# Patient Record
Sex: FEMALE | Race: WHITE | NOT HISPANIC OR LATINO | Employment: OTHER | URBAN - METROPOLITAN AREA
[De-identification: names, ages, dates, MRNs, and addresses within clinical notes are randomized per-mention and may not be internally consistent; named-entity substitution may affect disease eponyms.]

---

## 2018-07-06 ENCOUNTER — TELEPHONE (OUTPATIENT)
Dept: ENDOCRINOLOGY | Facility: HOSPITAL | Age: 73
End: 2018-07-06

## 2018-07-07 DIAGNOSIS — E04.2 MULTIPLE THYROID NODULES: Primary | ICD-10-CM

## 2018-08-02 ENCOUNTER — OFFICE VISIT (OUTPATIENT)
Dept: ENDOCRINOLOGY | Facility: HOSPITAL | Age: 73
End: 2018-08-02
Payer: MEDICARE

## 2018-08-02 VITALS
BODY MASS INDEX: 28.14 KG/M2 | DIASTOLIC BLOOD PRESSURE: 80 MMHG | HEIGHT: 63 IN | SYSTOLIC BLOOD PRESSURE: 136 MMHG | HEART RATE: 91 BPM | WEIGHT: 158.8 LBS

## 2018-08-02 DIAGNOSIS — E04.2 MULTIPLE THYROID NODULES: Primary | ICD-10-CM

## 2018-08-02 DIAGNOSIS — E78.5 HYPERLIPIDEMIA, UNSPECIFIED HYPERLIPIDEMIA TYPE: ICD-10-CM

## 2018-08-02 PROCEDURE — 99204 OFFICE O/P NEW MOD 45 MIN: CPT | Performed by: INTERNAL MEDICINE

## 2018-08-02 RX ORDER — PRAVASTATIN SODIUM 20 MG
TABLET ORAL DAILY
COMMUNITY
Start: 2018-07-29

## 2018-08-02 NOTE — LETTER
August 2, 2018     Melody Freitas DO  [address] [facility],[address]  [address] [place] [address]    Patient: Andrew Bran   YOB: 1945   Date of Visit: 8/2/2018       Dear Dr Agusto Martinez: Thank you for referring Marilin Andrade to me for evaluation  Below are my notes for this consultation  If you have questions, please do not hesitate to call me  I look forward to following your patient along with you  Sincerely,        Arnie Tapia DO        CC: No Recipients  Arnie Tapia DO  8/2/2018  1:46 PM  Sign at close encounter  8/2/2018    Assessment/Plan      Diagnoses and all orders for this visit:    Multiple thyroid nodules  -     US thyroid; Future  -     TSH, 3rd generation Lab Collect; Future  -     Comprehensive metabolic panel Lab Collect; Future    Hyperlipidemia, unspecified hyperlipidemia type  -     Lipid Panel with Direct LDL reflex Lab Collect; Future  -     Comprehensive metabolic panel Lab Collect; Future    Other orders  -     pravastatin (PRAVACHOL) 20 mg tablet; Take by mouth daily          Assessment/Plan:  1  Multiple thyroid nodules:  Overall, the nodules looks stable  I think the differences in size are more technical in difference in terms of year to year measurements  I would continue to monitor this on yearly basis with a TSH another thyroid ultrasound in 1 year  Will acquire records from prior thyroid nodule biopsies for completeness for our records  2   Hyperlipidemia:  Cholesterol looks good on pravastatin 20 mg daily  CC:   Thyroid nodules    History of Present Illness     HPI: Andrew Bran is a 72y o  year old female with history of thyroid nodules who presents to [facility] care  Previous patient of doctor Princess  She reports this was found after an illness and then she noticed some swelling in her throat  Thyroid nodules underwent 2 rounds of biopsies which were all benign reportedly  She then underwent surveillance over the years    She denies any neck compressive symptoms from the thyroid nodules  She does report some food getting stuck, such as rice and dry meat, but this sounds more esophageal in nature  She states this is not a frequent occurrence  She denies known first-degree relatives with thyroid cancer  She did not have any radiation to her head or neck in the past   Overall, she feels well  She also has a history of osteopenia and has DEXA scan monitoring through her family doctor  She was started on pravastatin for hyperlipidemia and is tolerating it fine  Review of Systems   Constitutional: Negative for fatigue  HENT: Negative for trouble swallowing and voice change  Eyes: Negative for visual disturbance  Respiratory: Negative for shortness of breath  Cardiovascular: Negative for palpitations and leg swelling  Gastrointestinal: Negative for abdominal pain, nausea and vomiting  Endocrine: Negative for polydipsia and polyuria  Musculoskeletal: Negative for arthralgias and myalgias  Skin: Negative for rash  Neurological: Negative for dizziness, tremors and weakness  Hematological: Negative for adenopathy  Psychiatric/Behavioral: Negative for agitation and confusion  Historical Information   History reviewed  No pertinent past medical history  History reviewed  No pertinent surgical history  Social History   History   Alcohol Use No     History   Drug Use No     History   Smoking Status    Never Smoker   Smokeless Tobacco    Never Used     Family History:   Family History   Problem Relation Age of Onset    Hypertension Sister        Meds/Allergies   Current Outpatient Prescriptions   Medication Sig Dispense Refill    pravastatin (PRAVACHOL) 20 mg tablet Take by mouth daily         No current facility-administered medications for this visit        Allergies   Allergen Reactions    Aspirin     Blue Dyes (Parenteral)     Latex     Metoprolol     Nifedipine        Objective   Vitals: Blood pressure 136/80, pulse 91, height 5' 2 64" (1 591 m), weight 72 kg (158 lb 12 8 oz)  Invasive Devices          No matching active lines, drains, or airways          Physical Exam   Constitutional: She is oriented to person, place, and time  She appears well-developed and well-nourished  No distress  HENT:   Head: Normocephalic and atraumatic  Eyes: Conjunctivae and EOM are normal  Pupils are equal, round, and reactive to light  Neck: Normal range of motion  Neck supple  Thyromegaly present  Cardiovascular: Normal rate and regular rhythm  No murmur heard  Pulmonary/Chest: Effort normal  No respiratory distress  Abdominal: Soft  Bowel sounds are normal  She exhibits no distension  Musculoskeletal: Normal range of motion  She exhibits no edema  Lymphadenopathy:     She has no cervical adenopathy  Neurological: She is alert and oriented to person, place, and time  She exhibits normal muscle tone  Skin: Skin is warm and dry  No rash noted  She is not diaphoretic  Psychiatric: She has a normal mood and affect  Her behavior is normal        The history was obtained from the review of the chart and from the patient  Lab Results:      Labs from [address] [place] [address] on 07/24/2018:  Sodium 142, potassium 4 4, BUN 15, creatinine 0 59, glucose 96, calcium 9 6, albumin 4 6, liver function within normal limits, total cholesterol 190, triglycerides 113, LDL 91, HDL 76, TSH 1 75  Thyroid ultrasound from [facility] on 07/24/2018: In the right lobe of the thyroid in the lower pole and interpolar segments these are occupied by a solid, heterogeneous, isoechoic solid and cystic approximately 4 4 x 2 8 x 3 8 cm that previously measured about 3 3 x 2 7 x 3 5 cm in 2017 and 4 2 x 3 1 x 3 0 cm on 2015 is stable  In the left thyroid lobe there are 3 discrete nodules measured    In the upper pole there is a hypoechoic approximately 1 2 x 0 7 x 0 9 cm nodule previously 1 5 x 0 6 x 0 8 cm in 2017 1 0 x 0 7 x 0 6 cm in 2015  In the interpolar segment is an approximately 1 2 x 0 7 x 1 0 cm hypoechoic nodule with bulky shadowing calcifications previously 1 0 x 0 5 x 1 0 cm in 2017 stable  This nodule underwent ultrasound-guided fine-needle aspiration biopsy on 07/18/2012 and measured about 1 1 x 0 5 x 0 7 cm in 2012  In the lower pole there is a predominantly solid approximately 2 2 x 1 8 x 1 6 cm nodule with a small echogenic non shadowing foci previously 2 3 x 1 6 x 1 6 cm in 2017 and 2 5 x 1 8 x 1 6 cm in 2015 stable in size in appearance  Impression:  Stable bilateral thyroid ultrasound as compared to 2017 considering differences in sonographic measurement technique  No future appointments

## 2018-08-02 NOTE — PROGRESS NOTES
8/2/2018    Assessment/Plan      Diagnoses and all orders for this visit:    Multiple thyroid nodules  -     US thyroid; Future  -     TSH, 3rd generation Lab Collect; Future  -     Comprehensive metabolic panel Lab Collect; Future    Hyperlipidemia, unspecified hyperlipidemia type  -     Lipid Panel with Direct LDL reflex Lab Collect; Future  -     Comprehensive metabolic panel Lab Collect; Future    Other orders  -     pravastatin (PRAVACHOL) 20 mg tablet; Take by mouth daily          Assessment/Plan:  1  Multiple thyroid nodules:  Overall, the nodules looks stable  I think the differences in size are more technical in difference in terms of year to year measurements  I would continue to monitor this on yearly basis with a TSH another thyroid ultrasound in 1 year  Will acquire records from prior thyroid nodule biopsies for completeness for our records  2   Hyperlipidemia:  Cholesterol looks good on pravastatin 20 mg daily  CC:   Thyroid nodules    History of Present Illness     HPI: Chante Griffith is a 72y o  year old female with history of thyroid nodules who presents to [facility] care  Previous patient of doctor Princess  She reports this was found after an illness and then she noticed some swelling in her throat  Thyroid nodules underwent 2 rounds of biopsies which were all benign reportedly  She then underwent surveillance over the years  She denies any neck compressive symptoms from the thyroid nodules  She does report some food getting stuck, such as rice and dry meat, but this sounds more esophageal in nature  She states this is not a frequent occurrence  She denies known first-degree relatives with thyroid cancer  She did not have any radiation to her head or neck in the past   Overall, she feels well  She also has a history of osteopenia and has DEXA scan monitoring through her family doctor  She was started on pravastatin for hyperlipidemia and is tolerating it fine      Review of Systems   Constitutional: Negative for fatigue  HENT: Negative for trouble swallowing and voice change  Eyes: Negative for visual disturbance  Respiratory: Negative for shortness of breath  Cardiovascular: Negative for palpitations and leg swelling  Gastrointestinal: Negative for abdominal pain, nausea and vomiting  Endocrine: Negative for polydipsia and polyuria  Musculoskeletal: Negative for arthralgias and myalgias  Skin: Negative for rash  Neurological: Negative for dizziness, tremors and weakness  Hematological: Negative for adenopathy  Psychiatric/Behavioral: Negative for agitation and confusion  Historical Information   History reviewed  No pertinent past medical history  History reviewed  No pertinent surgical history  Social History   History   Alcohol Use No     History   Drug Use No     History   Smoking Status    Never Smoker   Smokeless Tobacco    Never Used     Family History:   Family History   Problem Relation Age of Onset    Hypertension Sister        Meds/Allergies   Current Outpatient Prescriptions   Medication Sig Dispense Refill    pravastatin (PRAVACHOL) 20 mg tablet Take by mouth daily         No current facility-administered medications for this visit  Allergies   Allergen Reactions    Aspirin     Blue Dyes (Parenteral)     Latex     Metoprolol     Nifedipine        Objective   Vitals: Blood pressure 136/80, pulse 91, height 5' 2 64" (1 591 m), weight 72 kg (158 lb 12 8 oz)  Invasive Devices          No matching active lines, drains, or airways          Physical Exam   Constitutional: She is oriented to person, place, and time  She appears well-developed and well-nourished  No distress  HENT:   Head: Normocephalic and atraumatic  Eyes: Conjunctivae and EOM are normal  Pupils are equal, round, and reactive to light  Neck: Normal range of motion  Neck supple  Thyromegaly present  Cardiovascular: Normal rate and regular rhythm      No murmur heard  Pulmonary/Chest: Effort normal  No respiratory distress  Abdominal: Soft  Bowel sounds are normal  She exhibits no distension  Musculoskeletal: Normal range of motion  She exhibits no edema  Lymphadenopathy:     She has no cervical adenopathy  Neurological: She is alert and oriented to person, place, and time  She exhibits normal muscle tone  Skin: Skin is warm and dry  No rash noted  She is not diaphoretic  Psychiatric: She has a normal mood and affect  Her behavior is normal        The history was obtained from the review of the chart and from the patient  Lab Results:      Labs from [address] [place] [address] on 07/24/2018:  Sodium 142, potassium 4 4, BUN 15, creatinine 0 59, glucose 96, calcium 9 6, albumin 4 6, liver function within normal limits, total cholesterol 190, triglycerides 113, LDL 91, HDL 76, TSH 1 75  Thyroid ultrasound from [facility] on 07/24/2018: In the right lobe of the thyroid in the lower pole and interpolar segments these are occupied by a solid, heterogeneous, isoechoic solid and cystic approximately 4 4 x 2 8 x 3 8 cm that previously measured about 3 3 x 2 7 x 3 5 cm in 2017 and 4 2 x 3 1 x 3 0 cm on 2015 is stable  In the left thyroid lobe there are 3 discrete nodules measured  In the upper pole there is a hypoechoic approximately 1 2 x 0 7 x 0 9 cm nodule previously 1 5 x 0 6 x 0 8 cm in 2017 1 0 x 0 7 x 0 6 cm in 2015  In the interpolar segment is an approximately 1 2 x 0 7 x 1 0 cm hypoechoic nodule with bulky shadowing calcifications previously 1 0 x 0 5 x 1 0 cm in 2017 stable  This nodule underwent ultrasound-guided fine-needle aspiration biopsy on 07/18/2012 and measured about 1 1 x 0 5 x 0 7 cm in 2012  In the lower pole there is a predominantly solid approximately 2 2 x 1 8 x 1 6 cm nodule with a small echogenic non shadowing foci previously 2 3 x 1 6 x 1 6 cm in 2017 and 2 5 x 1 8 x 1 6 cm in 2015 stable in size in appearance    Impression: Stable bilateral thyroid ultrasound as compared to 2017 considering differences in sonographic measurement technique  No future appointments

## 2019-08-20 ENCOUNTER — OFFICE VISIT (OUTPATIENT)
Dept: ENDOCRINOLOGY | Facility: HOSPITAL | Age: 74
End: 2019-08-20
Payer: MEDICARE

## 2019-08-20 VITALS
BODY MASS INDEX: 28.74 KG/M2 | SYSTOLIC BLOOD PRESSURE: 132 MMHG | WEIGHT: 156.2 LBS | DIASTOLIC BLOOD PRESSURE: 88 MMHG | HEART RATE: 76 BPM | HEIGHT: 62 IN

## 2019-08-20 DIAGNOSIS — E04.2 MULTIPLE THYROID NODULES: Primary | ICD-10-CM

## 2019-08-20 DIAGNOSIS — E78.5 HYPERLIPIDEMIA, UNSPECIFIED HYPERLIPIDEMIA TYPE: ICD-10-CM

## 2019-08-20 PROCEDURE — 99214 OFFICE O/P EST MOD 30 MIN: CPT | Performed by: INTERNAL MEDICINE

## 2019-08-20 RX ORDER — BIOTIN 1 MG
2000 TABLET ORAL
COMMUNITY

## 2019-08-20 NOTE — PROGRESS NOTES
8/20/2019    Assessment/Plan      Diagnoses and all orders for this visit:    Multiple thyroid nodules  -     TSH, 3rd generation Lab Collect; Future  -     T4, free Lab Collect; Future  -     US thyroid; Future  -     Comprehensive metabolic panel Lab Collect; Future  -     CBC and differential- Lab Collect; Future  -     Lipid Panel with Direct LDL reflex Lab Collect; Future    Hyperlipidemia, unspecified hyperlipidemia type  -     TSH, 3rd generation Lab Collect; Future  -     T4, free Lab Collect; Future  -     US thyroid; Future  -     Comprehensive metabolic panel Lab Collect; Future  -     CBC and differential- Lab Collect; Future  -     Lipid Panel with Direct LDL reflex Lab Collect; Future    Other orders  -     Loratadine (CLARITIN PO); Take by mouth  -     B Complex Vitamins (B COMPLEX 100 PO); Take by mouth  -     vitamin E 100 UNIT capsule; Take 100 Units by mouth daily  -     Cholecalciferol (VITAMIN D3) 1000 units CAPS; Take 2,000 Units by mouth        Assessment/Plan:  1  Thyroid nodules: Stable on recent US  Follow-up in 1 year with a TSH and thyroid US just prior to that appt,    2  HLD: Continue current regimen  Follow-up in 1 year with lipid panel just prior  CC:  Follow-up    History of Present Illness     HPI: Humza Contreras is a 69y o  year old female with history of thyroid nodules who presents for follow-up appointment  Thyroid nodules were incidentally discovered after an illness years ago  She had 2 rounds of biopsies which were reportedly benign  In January of 2005 at [facility] she had thyroid nodule biopsy where the right upper lobe nodule was biopsied was negative for malignancy  In July of 2012 at [facility] the left sided thyroid nodule that was newly calcified was negative for malignancy as well  She then underwent surveillance over the years  No neck compressive symptoms    Occasionally she does have a sensation of food getting stuck but this is not a frequent occurrence  No first-degree relatives with thyroid cancer to her knowledge  No personal history of head or neck radiation  She presents today with no new health issues or symptoms of concern  Review of Systems   Constitutional: Negative for fatigue  HENT: Negative for trouble swallowing and voice change  Eyes: Negative for visual disturbance  Respiratory: Negative for shortness of breath  Cardiovascular: Negative for palpitations and leg swelling  Gastrointestinal: Negative for abdominal pain, nausea and vomiting  Endocrine: Negative for polydipsia and polyuria  Musculoskeletal: Negative for arthralgias and myalgias  Skin: Negative for rash  Neurological: Negative for dizziness, tremors and weakness  Hematological: Negative for adenopathy  Psychiatric/Behavioral: Negative for agitation and confusion  Historical Information   History reviewed  No pertinent past medical history  History reviewed  No pertinent surgical history  Social History   Social History     Substance and Sexual Activity   Alcohol Use No     Social History     Substance and Sexual Activity   Drug Use No     Social History     Tobacco Use   Smoking Status Never Smoker   Smokeless Tobacco Never Used     Family History:   Family History   Problem Relation Age of Onset    Hypertension Sister        Meds/Allergies   Current Outpatient Medications   Medication Sig Dispense Refill    B Complex Vitamins (B COMPLEX 100 PO) Take by mouth      Cholecalciferol (VITAMIN D3) 1000 units CAPS Take 2,000 Units by mouth      Loratadine (CLARITIN PO) Take by mouth      pravastatin (PRAVACHOL) 20 mg tablet Take by mouth daily        vitamin E 100 UNIT capsule Take 100 Units by mouth daily       No current facility-administered medications for this visit        Allergies   Allergen Reactions    Aspirin     Blue Dyes (Parenteral)     Latex     Metoprolol     Nifedipine        Objective   Vitals: Blood pressure 132/88, pulse 76, height 5' 2" (1 575 m), weight 70 9 kg (156 lb 3 2 oz)  Invasive Devices     None                 Physical Exam   Constitutional: She is oriented to person, place, and time  She appears well-developed and well-nourished  No distress  HENT:   Head: Normocephalic and atraumatic  Neck: Normal range of motion  Neck supple  No thyromegaly present  Cardiovascular: Normal rate and regular rhythm  Pulmonary/Chest: Effort normal and breath sounds normal  No respiratory distress  Abdominal: Soft  Bowel sounds are normal    Musculoskeletal: Normal range of motion  She exhibits no edema  Neurological: She is alert and oriented to person, place, and time  She exhibits normal muscle tone  Skin: Skin is warm and dry  No rash noted  She is not diaphoretic  Psychiatric: She has a normal mood and affect  Her behavior is normal    Vitals reviewed  The history was obtained from the review of the chart and from the patient  Lab Results:      Ultrasound of the thyroid from 07/25/2019 at [place]: In the right lobe mid to lower pole is a 4 1 x 3 6 x 4 9 cm previously 4 4 by 2 8 x 4 point cm solid heterogeneous isoechoic solid and cystic nodule  In the left upper lobe there is a 1 2 x 0 6 x 0 7 cm hypoechoic solid nodule unchanged  At the mid pole there is a 1 2 x 0 5 x 1 1 cm partially calcified solid nodule unchanged  At the lower pole of the left lobe is a 2 3 x 1 8 x 1 6 cm nearly isoechoic somewhat heterogeneous solid nodule unchanged  DEXA scan from [facility] on 07/25/2019:  Lumbar spine T-score-1 5, left hip T-score-2 0  Labs from 08/13/2019 at [facility] TSH 2 58  No future appointments  Portions of the record may have been created with voice recognition software  Occasional wrong word or "sound a like" substitutions may have occurred due to the inherent limitations of voice recognition software   Read the chart carefully and recognize, using context, where substitutions have occurred

## 2020-06-27 ENCOUNTER — TELEPHONE (OUTPATIENT)
Dept: OTHER | Facility: OTHER | Age: 75
End: 2020-06-27

## 2020-08-20 ENCOUNTER — TELEPHONE (OUTPATIENT)
Dept: ENDOCRINOLOGY | Facility: HOSPITAL | Age: 75
End: 2020-08-20

## 2020-08-20 NOTE — TELEPHONE ENCOUNTER
We rescheduled 8/26 to 11/25  Pt had labs done at  on 8/14  She wants results now and does not want to wait 3 months  We have not received results   I advised I will request for GV

## 2020-08-21 NOTE — TELEPHONE ENCOUNTER
Recent labs show electrolytes, liver function, kidney function, blood counts, thyroid function, and cholesterol all look fine    Glucose was 102 and was 106 last year,

## 2020-11-25 ENCOUNTER — TELEMEDICINE (OUTPATIENT)
Dept: ENDOCRINOLOGY | Facility: HOSPITAL | Age: 75
End: 2020-11-25

## 2020-11-25 DIAGNOSIS — R73.01 IMPAIRED FASTING GLUCOSE: ICD-10-CM

## 2020-11-25 DIAGNOSIS — E04.2 MULTIPLE THYROID NODULES: Primary | ICD-10-CM

## 2020-11-25 DIAGNOSIS — E78.5 HYPERLIPIDEMIA, UNSPECIFIED HYPERLIPIDEMIA TYPE: ICD-10-CM

## 2020-11-25 PROCEDURE — 99441 PR PHYS/QHP TELEPHONE EVALUATION 5-10 MIN: CPT | Performed by: INTERNAL MEDICINE

## 2020-11-25 RX ORDER — ASCORBIC ACID 500 MG
500 TABLET ORAL DAILY
COMMUNITY

## 2020-11-25 RX ORDER — DIPHENHYDRAMINE HCL 25 MG
25 CAPSULE ORAL EVERY 6 HOURS PRN
COMMUNITY

## 2020-11-25 RX ORDER — ALBUTEROL SULFATE 1.25 MG/3ML
1 SOLUTION RESPIRATORY (INHALATION) EVERY 6 HOURS PRN
COMMUNITY

## 2021-08-25 ENCOUNTER — OFFICE VISIT (OUTPATIENT)
Dept: ENDOCRINOLOGY | Facility: HOSPITAL | Age: 76
End: 2021-08-25
Payer: MEDICARE

## 2021-08-25 VITALS
WEIGHT: 146.6 LBS | HEART RATE: 84 BPM | SYSTOLIC BLOOD PRESSURE: 142 MMHG | BODY MASS INDEX: 26.81 KG/M2 | DIASTOLIC BLOOD PRESSURE: 78 MMHG

## 2021-08-25 DIAGNOSIS — R73.03 PREDIABETES: ICD-10-CM

## 2021-08-25 DIAGNOSIS — E78.5 HYPERLIPIDEMIA, UNSPECIFIED HYPERLIPIDEMIA TYPE: ICD-10-CM

## 2021-08-25 DIAGNOSIS — E04.2 MULTIPLE THYROID NODULES: Primary | ICD-10-CM

## 2021-08-25 PROCEDURE — 99214 OFFICE O/P EST MOD 30 MIN: CPT | Performed by: INTERNAL MEDICINE

## 2021-08-25 RX ORDER — FEXOFENADINE HCL 180 MG/1
180 TABLET ORAL DAILY
COMMUNITY

## 2021-08-25 RX ORDER — FLUTICASONE PROPIONATE 50 MCG
1 SPRAY, SUSPENSION (ML) NASAL DAILY
COMMUNITY

## 2021-08-25 NOTE — PROGRESS NOTES
8/25/2021    Assessment/Plan      Diagnoses and all orders for this visit:    Multiple thyroid nodules  -     Hemoglobin A1C; Future  -     Comprehensive metabolic panel; Future  -     Lipid Panel with Direct LDL reflex; Future  -     TSH, 3rd generation; Future  -     T4, free; Future  -     US thyroid; Future    Prediabetes  -     Hemoglobin A1C; Future  -     Comprehensive metabolic panel; Future  -     Lipid Panel with Direct LDL reflex; Future  -     TSH, 3rd generation; Future  -     T4, free; Future  -     US thyroid; Future    Hyperlipidemia, unspecified hyperlipidemia type  -     Hemoglobin A1C; Future  -     Comprehensive metabolic panel; Future  -     Lipid Panel with Direct LDL reflex; Future  -     TSH, 3rd generation; Future  -     T4, free; Future  -     US thyroid; Future    Other orders  -     fexofenadine (ALLEGRA) 180 MG tablet; Take 180 mg by mouth daily  -     fluticasone (FLONASE) 50 mcg/act nasal spray; 1 spray into each nostril daily        Assessment/Plan:    1  Thyroid nodules: These are stable on recent ultrasound  Continue annual surveillance and we will see her back in the office in 1 year with test as ordered above just prior  2  Hyperlipidemia: Doing well on current regimen  3  Prediabetes: Monitor annually  CC: Follow-up    History of Present Illness     HPI: Josey Haider is a 78y o  year old female with history of  Thyroid nodules who presents for a follow-up appointment  Thyroid nodules were initially incidentally discovered  After 2 rounds of biopsies which were reportedly benign her nodules have been monitored over time  In January of 2005 at [facility] a right upper lobe nodules biopsied was negative for malignancy  In July of 2012 a left-sided thyroid nodule that was newly calcified was negative for malignancy as well  No personal history of head or neck radiation  No first-degree relatives with thyroid cancer     She presents today overall feeling well  She reports being on a steroid inhaler through her PCP  No swallowing difficulties  Review of Systems   Constitutional: Negative for fatigue  HENT: Negative for trouble swallowing and voice change  Eyes: Negative for visual disturbance  Respiratory: Negative for shortness of breath  Cardiovascular: Negative for palpitations and leg swelling  Gastrointestinal: Negative for abdominal pain, nausea and vomiting  Endocrine: Negative for polydipsia and polyuria  Musculoskeletal: Negative for arthralgias and myalgias  Skin: Negative for rash  Neurological: Negative for dizziness, tremors and weakness  Hematological: Negative for adenopathy  Psychiatric/Behavioral: Negative for agitation and confusion  Historical Information   History reviewed  No pertinent past medical history  History reviewed  No pertinent surgical history    Social History   Social History     Substance and Sexual Activity   Alcohol Use No     Social History     Substance and Sexual Activity   Drug Use No     Social History     Tobacco Use   Smoking Status Never Smoker   Smokeless Tobacco Never Used     Family History:   Family History   Problem Relation Age of Onset    Hypertension Sister        Meds/Allergies   Current Outpatient Medications   Medication Sig Dispense Refill    albuterol (ACCUNEB) 1 25 MG/3ML nebulizer solution Take 1 ampule by nebulization every 6 (six) hours as needed for wheezing      ascorbic acid (VITAMIN C) 500 mg tablet Take 500 mg by mouth daily      B Complex Vitamins (B COMPLEX 100 PO) Take by mouth      Cholecalciferol (VITAMIN D3) 1000 units CAPS Take 2,000 Units by mouth      diphenhydrAMINE (BENADRYL) 25 mg capsule Take 25 mg by mouth every 6 (six) hours as needed for itching      fexofenadine (ALLEGRA) 180 MG tablet Take 180 mg by mouth daily      fluticasone (FLONASE) 50 mcg/act nasal spray 1 spray into each nostril daily      PAPAYA ENZYME PO Take by mouth      pravastatin (PRAVACHOL) 20 mg tablet Take by mouth daily        vitamin E 100 UNIT capsule Take 100 Units by mouth daily      LECITHIN-19 PO Take by mouth (Patient not taking: Reported on 8/25/2021)      Loratadine (CLARITIN PO) Take by mouth (Patient not taking: Reported on 8/25/2021)       No current facility-administered medications for this visit  Allergies   Allergen Reactions    Aspirin     Blue Dyes (Parenteral) - Food Allergy     Latex     Metoprolol     Nifedipine        Objective   Vitals: Blood pressure 142/78, pulse 84, weight 66 5 kg (146 lb 9 6 oz)  Invasive Devices     None                 Physical Exam  Vitals reviewed  Constitutional:       General: She is not in acute distress  Appearance: She is well-developed  She is not diaphoretic  HENT:      Head: Normocephalic and atraumatic  Eyes:      Conjunctiva/sclera: Conjunctivae normal       Pupils: Pupils are equal, round, and reactive to light  Neck:      Thyroid: No thyromegaly  Cardiovascular:      Rate and Rhythm: Normal rate and regular rhythm  Pulmonary:      Effort: Pulmonary effort is normal  No respiratory distress  Breath sounds: Normal breath sounds  Abdominal:      General: Bowel sounds are normal       Palpations: Abdomen is soft  Musculoskeletal:         General: Normal range of motion  Cervical back: Normal range of motion and neck supple  Skin:     General: Skin is warm and dry  Findings: No rash  Neurological:      Mental Status: She is alert and oriented to person, place, and time  Motor: No abnormal muscle tone  Psychiatric:         Behavior: Behavior normal          The history was obtained from the review of the chart and from the patient  Lab Results:        Labs from [address] [place] [address] on 08/18/2021:   TSH 1 56, A1c 5 8, glucose 109     ultrasound of the thyroid from [address] [place] [address] on 08/03/2021:    In the right lobe of the thyroid there is a dominant isoechoic solid nodule with areas of cystic degeneration measuring 4 8 x 3 8 x 4 2 cm  In the left lobe of the thyroid is a spongiform benign-appearing nodule in the superior pole measuring 1 1 x 0 7 x 0 8 cm  A predominantly cystic nodule in the superior pole of the left lobe of the thyroid measures 7 x 5 x 5 mm  An isoechoic solid nodule in the mid to inferior left thyroid pole with small echogenic foci it measures 2 3 x 1 8 x 1 6 cm  Stability of size over several years favors benign  The calcified nodule in the mid pole of left lobe measures 7 x 5 x 6 mm  A solid prominently isoechoic nodule in the right lobe of the isthmus measures 1 1 x 0 6 x 0 7 cm  No future appointments  Portions of the record may have been created with voice recognition software  Occasional wrong word or "sound a like" substitutions may have occurred due to the inherent limitations of voice recognition software  Read the chart carefully and recognize, using context, where substitutions have occurred

## 2022-08-18 LAB — HBA1C MFR BLD HPLC: 6.2 %

## 2022-09-05 ENCOUNTER — TELEPHONE (OUTPATIENT)
Dept: OTHER | Facility: OTHER | Age: 77
End: 2022-09-05

## 2022-10-17 ENCOUNTER — OFFICE VISIT (OUTPATIENT)
Dept: ENDOCRINOLOGY | Facility: HOSPITAL | Age: 77
End: 2022-10-17
Payer: MEDICARE

## 2022-10-17 VITALS
HEIGHT: 62 IN | SYSTOLIC BLOOD PRESSURE: 126 MMHG | HEART RATE: 72 BPM | DIASTOLIC BLOOD PRESSURE: 80 MMHG | WEIGHT: 144 LBS | BODY MASS INDEX: 26.5 KG/M2

## 2022-10-17 DIAGNOSIS — R73.03 PREDIABETES: ICD-10-CM

## 2022-10-17 DIAGNOSIS — E04.2 MULTIPLE THYROID NODULES: Primary | ICD-10-CM

## 2022-10-17 DIAGNOSIS — E78.5 HYPERLIPIDEMIA, UNSPECIFIED HYPERLIPIDEMIA TYPE: ICD-10-CM

## 2022-10-17 PROCEDURE — 99214 OFFICE O/P EST MOD 30 MIN: CPT | Performed by: INTERNAL MEDICINE

## 2022-10-17 NOTE — PATIENT INSTRUCTIONS
The thyroid blood work is normal     The thyroid ultrasound shows no change in nodule sizes  The hgba1c is 6 2%  this is prediabetes  Watch the diet  Follow up in 1 year with blood work and thyroid ultrasound

## 2022-10-17 NOTE — PROGRESS NOTES
10/17/2022    Assessment/Plan      Diagnoses and all orders for this visit:    Multiple thyroid nodules  -     TSH, 3rd generation Lab Collect; Future  -     T4, free Lab Collect; Future  -     HEMOGLOBIN A1C W/ EAG ESTIMATION Lab Collect; Future  -     Comprehensive metabolic panel Lab Collect; Future  -     Lipid Panel with Direct LDL reflex Lab Collect; Future  -     US thyroid; Future    Hyperlipidemia, unspecified hyperlipidemia type  -     TSH, 3rd generation Lab Collect; Future  -     T4, free Lab Collect; Future  -     HEMOGLOBIN A1C W/ EAG ESTIMATION Lab Collect; Future  -     Comprehensive metabolic panel Lab Collect; Future  -     Lipid Panel with Direct LDL reflex Lab Collect; Future    Prediabetes  -     TSH, 3rd generation Lab Collect; Future  -     T4, free Lab Collect; Future  -     HEMOGLOBIN A1C W/ EAG ESTIMATION Lab Collect; Future  -     Comprehensive metabolic panel Lab Collect; Future  -     Lipid Panel with Direct LDL reflex Lab Collect; Future        Assessment/Plan:  1  Multiple thyroid nodules  Recent thyroid function tests are normal   She is biochemically euthyroid  She is no compressive thyroid symptoms  Thyroid ultrasound does demonstrate stable thyroid nodules  She still has a very large nodule in the right lobe so thyroid ultrasounds need to be performed on a yearly basis  2  Hyperlipidemia  She will continue the same pravastatin 20 mg daily as lipid profile is good  3  Prediabetes  She will continue to work on dietary changes and exercise  Most recent hemoglobin A1c is stable at 6 2%  I have asked her to follow up in 1 year with preceding hemoglobin A1c, CMP, lipid panel, TSH, free T4, and thyroid ultrasound  CC:  Thyroid nodule, prediabetes, hyperlipidemia follow-up    History of Present Illness     HPI: Domingo Tolliver is a 78y o  year old female with history of multiple thyroid nodules with hyperlipidemia and prediabetes for follow-up appointment      She was being followed by Dr Danii Morelos who has now moved to our center [facility] office  She is transitioning to care through May  Thyroid nodules were incidentally discovered years ago  She had 2 rounds of biopsies which demonstrated benign nodules  She had been monitored over time  Two thousand five, a right upper lobe nodules were biopsied and in July 2012 left thyroid nodule that was newly calcified was biopsied and both were negative for malignancy  She has no personal history of head or neck irradiation and no relatives with thyroid disease  She denies heat or cold intolerance, palpitation, tremors, or weight changes  She can be fatigued when just watching TV  She has dry skin and chronic brittle nails but no hair loss  She has occasional constipation and anxiety  She denies diarrhea, depression, or insomnia  She denies diplopia except when wearing her glasses  She has to drink water in order to get food swallowed sometimes  She has hyperlipidemia and takes pravastatin 20 mg daily  She denies chest pain but has some shortness of breath since a bronchial infection last year  She has a history of prediabetes  She is on no medications  She is 3-4 times per night nocturia and polyuria but no polydipsia or polyphagia  She is having some blurry vision with cataracts  She denies numbness or tingling of the feet  Review of Systems   Constitutional: Positive for fatigue  Negative for unexpected weight change  Eat out of habit and a nibbler  Falling asleep in chair watching TV  HENT: Positive for trouble swallowing  Has to drink water to help chew and swallow  Eyes: Positive for visual disturbance  Wears glasses  starting to see double with the cataracts    Getting some cataracts  Respiratory: Positive for shortness of breath  Negative for chest tightness  Gets SOB more easily since last year when had bronchial infection and needed steroids      Cardiovascular: Negative for chest pain and palpitations  Gastrointestinal: Positive for constipation  Negative for abdominal pain, diarrhea and nausea  Occasional constipation  Endocrine: Positive for polyuria  Negative for cold intolerance, heat intolerance, polydipsia and polyphagia  Nocturia 3-4 times a night  Skin: Negative for rash  Has dry skin  Has chronic brittle nails  No hair loss  Neurological: Negative for dizziness, tremors, weakness, light-headedness, numbness and headaches  Psychiatric/Behavioral: Negative for dysphoric mood and sleep disturbance  The patient is nervous/anxious  Sleeping interrupted every 1-2 hours to urinate  Worries about family  Historical Information   Past Medical History:   Diagnosis Date   • Angioedema    • osteoarthritis     back   • Osteopenia      History reviewed  No pertinent surgical history    Social History   Social History     Substance and Sexual Activity   Alcohol Use No     Social History     Substance and Sexual Activity   Drug Use No     Social History     Tobacco Use   Smoking Status Never Smoker   Smokeless Tobacco Never Used     Family History:   Family History   Problem Relation Age of Onset   • Hypertension Sister    • Asthma Sister    • No Known Problems Sister    • Heart attack Other    • Heart failure Other    • Seizures Other    • Asthma Other        Meds/Allergies   Current Outpatient Medications   Medication Sig Dispense Refill   • albuterol (ACCUNEB) 1 25 MG/3ML nebulizer solution Take 1 ampule by nebulization every 6 (six) hours as needed for wheezing     • ascorbic acid (VITAMIN C) 500 mg tablet Take 500 mg by mouth daily     • B Complex Vitamins (B COMPLEX 100 PO) Take by mouth     • Cholecalciferol (VITAMIN D3) 1000 units CAPS Take 2,000 Units by mouth     • diphenhydrAMINE (BENADRYL) 25 mg capsule Take 25 mg by mouth every 6 (six) hours as needed for itching     • fexofenadine (ALLEGRA) 180 MG tablet Take 180 mg by mouth daily     • fluticasone (FLONASE) 50 mcg/act nasal spray 1 spray into each nostril daily     • PAPAYA ENZYME PO Take by mouth     • pravastatin (PRAVACHOL) 20 mg tablet Take by mouth daily       • vitamin E 100 UNIT capsule Take 100 Units by mouth daily       No current facility-administered medications for this visit  Allergies   Allergen Reactions   • Aspirin    • Blue Dyes (Parenteral) - Food Allergy    • Latex    • Metoprolol    • Nifedipine        Objective   Vitals: Blood pressure 126/80, pulse 72, height 5' 2" (1 575 m), weight 65 3 kg (144 lb)  Invasive Devices  Report    None                 Physical Exam  Vitals reviewed  Constitutional:       Appearance: Normal appearance  She is well-developed  HENT:      Head: Normocephalic and atraumatic  Eyes:      Extraocular Movements: Extraocular movements intact  Conjunctiva/sclera: Conjunctivae normal       Comments: No lid lag, stare, proptosis, or periorbital edema  Neck:      Thyroid: No thyromegaly  Vascular: No carotid bruit  Comments: Thyroid irregular in feel  No palpable nodules  No bruits over the thyroid gland  Cardiovascular:      Rate and Rhythm: Normal rate and regular rhythm  Heart sounds: Normal heart sounds  No murmur heard  Pulmonary:      Effort: Pulmonary effort is normal       Breath sounds: Normal breath sounds  No wheezing  Abdominal:      Palpations: Abdomen is soft  Musculoskeletal:         General: No deformity  Normal range of motion  Cervical back: Normal range of motion and neck supple  Right lower leg: No edema  Left lower leg: No edema  Comments: No tremor of the outstretched hands  Lymphadenopathy:      Cervical: No cervical adenopathy  Skin:     General: Skin is warm and dry  Findings: No rash  Neurological:      Mental Status: She is alert and oriented to person, place, and time  Deep Tendon Reflexes: Reflexes are normal and symmetric  Comments: Patellar deep tendon reflexes normal         The history was obtained from the review of the chart and from the patient  Lab Results:      Blood work done at [facility] on 08/18/2022 showed hemoglobin A1c of 6 2%  TSH is 1 92 with a free T4 of 1 11  Twenty-five hydroxy vitamin-D level is 31 4  CMP showed a glucose of 99 fasting but was otherwise normal       Total cholesterol 181, triglyceride 73, HDL 72, LDL 94  CBC is normal     Thyroid ultrasound:     Thyroid ultrasound performed on 08/26/2022 at [facility] showed a right lobe of the thyroid measuring 6 3 x 4 6 x 3 2 cm and a left lobe of the thyroid measuring 4 2 x 1 7 x 2 cm  There is a 4 9 solid and cystic mass in the right lobe of the thyroid that is T rad 2 and not significantly changed in size  There is a T rad 3 mixed echogenicity 1 8 cm left-sided thyroid nodule that has decreased in size  There is a left-sided 1 1 cm spongiform nodule and a 0 7 cm nodule on the left unchanged in size        Future Appointments   Date Time Provider Caleb Singleton   10/18/2023 10:00 AM Veneda Bernheim, MD [facility]

## 2023-08-16 LAB
CREAT ?TM UR-SCNC: 226 UMOL/L
EXT ALBUMIN URINE RANDOM: 4.4
HBA1C MFR BLD HPLC: 6.1 %
MICROALBUMIN/CREAT UR: 19 MG/G{CREAT}

## 2023-09-07 DIAGNOSIS — E04.2 MULTIPLE THYROID NODULES: Primary | ICD-10-CM

## 2023-09-28 ENCOUNTER — TELEPHONE (OUTPATIENT)
Dept: ENDOCRINOLOGY | Facility: HOSPITAL | Age: 78
End: 2023-09-28

## 2023-09-28 NOTE — TELEPHONE ENCOUNTER
LORENZA    The patient called this morning and stated that she had her needle biopsy on 9/27/2023 at [facility] and she stated that she was told by Dr. Vasquez Loo that the results should be in about 5-10 days. She also stated that they took 8 biopsies and that she is is having a little more discomfort but she will use Tylenol if needed. They also noted that the right side was enlarged as well but they paid more attention to the left. She would like you to call if by chance they send the results over to you sooner and if not she will be calling to see if we received them as well.

## 2023-10-03 ENCOUNTER — TELEPHONE (OUTPATIENT)
Dept: ENDOCRINOLOGY | Facility: HOSPITAL | Age: 78
End: 2023-10-03

## 2023-10-03 NOTE — TELEPHONE ENCOUNTER
The patient called this morning and left a message concerning her results for her thyroid biopsy that was done on 9/29/2023.     Please review and advise

## 2023-10-04 NOTE — TELEPHONE ENCOUNTER
Unfortunately, the biopsy did not get enough material even after 8 needle sticks. We can discuss the next step at her appointment later this month.

## 2023-10-18 ENCOUNTER — OFFICE VISIT (OUTPATIENT)
Dept: ENDOCRINOLOGY | Facility: HOSPITAL | Age: 78
End: 2023-10-18
Payer: MEDICARE

## 2023-10-18 VITALS
HEIGHT: 62 IN | BODY MASS INDEX: 25.88 KG/M2 | WEIGHT: 140.6 LBS | DIASTOLIC BLOOD PRESSURE: 86 MMHG | HEART RATE: 102 BPM | SYSTOLIC BLOOD PRESSURE: 132 MMHG

## 2023-10-18 DIAGNOSIS — E78.5 HYPERLIPIDEMIA, UNSPECIFIED HYPERLIPIDEMIA TYPE: ICD-10-CM

## 2023-10-18 DIAGNOSIS — E04.2 MULTIPLE THYROID NODULES: Primary | ICD-10-CM

## 2023-10-18 DIAGNOSIS — R73.03 PREDIABETES: ICD-10-CM

## 2023-10-18 PROCEDURE — 99214 OFFICE O/P EST MOD 30 MIN: CPT | Performed by: INTERNAL MEDICINE

## 2023-10-18 NOTE — PROGRESS NOTES
10/18/2023    Assessment/Plan      Diagnoses and all orders for this visit:    Multiple thyroid nodules  -     US guided thyroid biopsy with molecular testing; Future  -     HEMOGLOBIN A1C W/ EAG ESTIMATION Lab Collect; Future  -     Comprehensive metabolic panel Lab Collect; Future  -     CBC and differential Lab Collect; Future  -     TSH, 3rd generation Lab Collect; Future  -     T4, free Lab Collect; Future  -     Lipid Panel with Direct LDL reflex Lab Collect; Future    Hyperlipidemia, unspecified hyperlipidemia type  -     HEMOGLOBIN A1C W/ EAG ESTIMATION Lab Collect; Future  -     Comprehensive metabolic panel Lab Collect; Future  -     CBC and differential Lab Collect; Future  -     TSH, 3rd generation Lab Collect; Future  -     T4, free Lab Collect; Future  -     Lipid Panel with Direct LDL reflex Lab Collect; Future    Prediabetes  -     HEMOGLOBIN A1C W/ EAG ESTIMATION Lab Collect; Future  -     Comprehensive metabolic panel Lab Collect; Future  -     CBC and differential Lab Collect; Future  -     TSH, 3rd generation Lab Collect; Future  -     T4, free Lab Collect; Future  -     Lipid Panel with Direct LDL reflex Lab Collect; Future        Assessment/Plan:  1. Multiple thyroid nodules. Most recent thyroid function studies are normal.  She is biochemically and clinically euthyroid. She had a thyroid ultrasound performed on her left lower lobe thyroid nodule had increased in size and was T RADS 5. She underwent biopsy but unfortunately the biopsy was acellular and nondiagnostic. Options include rebiopsy or observing over time and she would like to rebiopsy it at least once. She has to wait 3 months but I will have them try to rebiopsy that thyroid nodule under ultrasound guidance and send an extra sample for molecular testing/Afirma testing if it does read [place] class III or IV.   She was warned that if it again says that it is acellular, then we would have to just follow it over time with serial ultrasounds or surgically remove it. 2.  Prediabetes. Hemoglobin A1c is 6.1%. This is slightly improved. I reassured her she does not have diabetes. Diabetic lower carbohydrate diet means limiting carbohydrates to 45 to 60 g per meal and 15 to 30 g per snack and she was given examples of carbohydrate servings. 3.  Hyperlipidemia. Her recent lipid profile does show an elevated total cholesterol at 216 but her HDL cholesterol is 96 and her LDL is 105. I reassured her that given how high her HDL is, that is falsely elevating her total cholesterol and she does not need any cholesterol-lowering agents based on this profile. I have scheduled her to follow-up in 1 year with preceding hemoglobin A1c, CMP, CBC, TSH, free T4, and lipid panel. I have also ordered a fine-needle aspiration biopsy of that left thyroid nodule under ultrasound guidance to be done near the end of December or early January. I can order a future thyroid ultrasound if needed after that biopsy. CC: Thyroid nodule, lipid, prediabetes follow-up    History of Present Illness     HPI: Augustus Severin is a 70y.o. year old female with history of multiple thyroid nodules with hyperlipidemia and prediabetes for follow-up visit. Thyroid nodules were incidentally discovered years ago. She had 2 rounds of biopsies which demonstrated benign nodules. She had been monitored over time. In 2005, a right upper lobe nodules were biopsied and in July 2012 left thyroid nodule that was newly calcified was biopsied and both were negative for malignancy. She has no personal history of head or neck irradiation and no relatives with thyroid disease. She denies heat or cold intolerance, palpitation except with stress, or tremors. Weight is 4 pounds less than last year. She reveals that she is always fatigued and has no energy. She does have a lot of insomnia and will wake frequently from sleep.   She denies diarrhea or constipation, anxiety or depression. She has dry skin and always has brittle nails but no hair loss. She denies diplopia. She does have some trouble with swallowing certain foods like rice or meats if they are not cut up into small pieces and she must drink water a lot to swallow these foods. She has hyperlipidemia and takes pravastatin 20 mg daily. She denies chest pain but does have some shortness of breath when more walking far distances. She has a history of prediabetes but is currently on no medications. admits eating more sweets and bread. She has no polyuria, polydipsia, or polyphasia but has 3-4 times per night nocturia. She has no numbness or tingling of the feet. She has no blurry vision. Review of Systems   Constitutional:  Positive for fatigue. Negative for unexpected weight change. Weight 4 pounds less than last year. Always fatigued and no energy. HENT:  Positive for sore throat and trouble swallowing. Sore throat today. Rice and meats are more difficult to swallow, must drink water a lot. Must cut all foods very small especially meats. Eyes:  Negative for visual disturbance. Wears glasses. Has some cataracts. Respiratory:  Positive for shortness of breath. Negative for chest tightness. Can get SOB with walking farther. Cardiovascular:  Negative for chest pain and palpitations. Can have palpitations with stress. Gastrointestinal:  Negative for abdominal pain, constipation, diarrhea and nausea. Some Bm's more on the solid and constipated side. Gets a "sour stomach " a lot. Endocrine: Negative for cold intolerance, heat intolerance, polydipsia, polyphagia and polyuria. Nocturia 3-4 times a night. Musculoskeletal:  Positive for arthralgias. Left knee pain. Uses a cane, not walking as  much. Did fall recently on front of right knee/leg after tripped over a flag she was carrying. Skin:  Negative for wound. Has dry skin.  Always brittle nails. No hair loss. Neurological:  Positive for headaches. Negative for dizziness, tremors, light-headedness and numbness. Sometimes posterior head pressure. Balance not good in general and uses a cane. Can trip and catch toe on something. Psychiatric/Behavioral:  Positive for sleep disturbance. Negative for dysphoric mood. The patient is not nervous/anxious. Under a lot of stress. Helping sister with doctor appointments. Can have bad nights of sleep, will frequently get up to urinate. Historical Information   Past Medical History:   Diagnosis Date    Angioedema     osteoarthritis     back    Osteopenia      History reviewed. No pertinent surgical history.   Social History   Social History     Substance and Sexual Activity   Alcohol Use No     Social History     Substance and Sexual Activity   Drug Use No     Social History     Tobacco Use   Smoking Status Never   Smokeless Tobacco Never     Family History:   Family History   Problem Relation Age of Onset    Hypertension Sister     Asthma Sister     No Known Problems Sister     Heart attack Other     Heart failure Other     Seizures Other     Asthma Other        Meds/Allergies   Current Outpatient Medications   Medication Sig Dispense Refill    albuterol (ACCUNEB) 1.25 MG/3ML nebulizer solution Take 1 ampule by nebulization every 6 (six) hours as needed for wheezing      B Complex Vitamins (B COMPLEX 100 PO) Take by mouth With vitamin C in it      Cholecalciferol (VITAMIN D3) 1000 units CAPS Take 2,000 Units by mouth      diphenhydrAMINE (BENADRYL) 25 mg capsule Take 25 mg by mouth every 6 (six) hours as needed for itching      fexofenadine (ALLEGRA) 180 MG tablet Take 180 mg by mouth daily      pravastatin (PRAVACHOL) 20 mg tablet Take by mouth daily        vitamin E, tocopherol, 400 units capsule Take 400 Units by mouth daily      fluticasone (FLONASE) 50 mcg/act nasal spray 1 spray into each nostril daily (Patient not taking: Reported on 10/18/2023)      PAPAYA ENZYME PO Take by mouth (Patient not taking: Reported on 10/18/2023)       No current facility-administered medications for this visit. Allergies   Allergen Reactions    Aspirin     Blue Dyes (Parenteral) - Food Allergy     Latex     Metoprolol     Nifedipine        Objective   Vitals: Blood pressure 132/86, pulse 102, height 5' 2" (1.575 m), weight 63.8 kg (140 lb 9.6 oz). Invasive Devices       None                   Physical Exam  Vitals reviewed. Constitutional:       Appearance: Normal appearance. She is well-developed. HENT:      Head: Normocephalic and atraumatic. Eyes:      Extraocular Movements: Extraocular movements intact. Conjunctiva/sclera: Conjunctivae normal.      Comments: No lid lag, stare, proptosis, or periorbital edema. Neck:      Thyroid: No thyromegaly. Vascular: No carotid bruit. Comments: Thyroid irregular in feel and mostly substernal and unable to palpate nodules. Cardiovascular:      Rate and Rhythm: Normal rate and regular rhythm. Heart sounds: Normal heart sounds. No murmur heard. Pulmonary:      Effort: Pulmonary effort is normal.      Breath sounds: Normal breath sounds. No wheezing. Abdominal:      Palpations: Abdomen is soft. Musculoskeletal:         General: No deformity. Cervical back: Normal range of motion and neck supple. Right lower leg: No edema. Left lower leg: No edema. Comments: No tremor of the outstretched hands. Lymphadenopathy:      Cervical: No cervical adenopathy. Skin:     General: Skin is warm and dry. Findings: No rash. Neurological:      Mental Status: She is alert and oriented to person, place, and time. Deep Tendon Reflexes: Reflexes are normal and symmetric. Comments: Patellar deep tendon reflexes normal.         The history was obtained from the review of the chart and from the patient.     Lab Results:      Blood work performed at [facility] on 8/16/2023 showed a hemoglobin A1c of 6.1%. CMP showed a glucose of 99 fasting, GFR 76, but was otherwise normal.    Total cholesterol 216, triglycerides 76, HDL 96, . TSH is 2.61 with a free T4 of 1.23. CBC is normal.    Thyroid ultrasound:    Thyroid ultrasound performed at [facility] on 8/30/2023 showed a right lobe of the thyroid measuring 6.4 x 3.7 x 3.9 cm and the left lobe of the thyroid measuring 4.7 x 1.9 x 2.2 cm. There is a right sided mid to lower very large 5.2 cm thyroid nodule that is T RADS 2 that is a few millimeters larger in size. There is a left upper lobe T RADS 1/spongiform thyroid nodule measuring 1.4 cm. There is a left lower pole 2.3 cm thyroid nodule T RADS 5 that is increased by 5 mm and has lot of calcifications. Fine-needle aspiration biopsy of left lower thyroid nodule under ultrasound guidance:    Fine-needle aspiration biopsy performed on 9/27/2023 of the left lower lobe T RADS 5 thyroid nodule demonstrated pathology that was [place] class I with scant cellularity.         Future Appointments   Date Time Provider [address]   10/22/2024 11:20 AM Alejandro Nam, [address]

## 2023-10-18 NOTE — PATIENT INSTRUCTIONS
The thyroid blood work is normal.     The cholesterol is fine because the good cholesterol is so high. Continue the same pravastatin. The hgba1c is 6.1%. this is slightly improved. You have prediabetes, not diabetes. Watch carbohydrates, limit carb's to 45-60 grams per meal, 15-30 grams per snack. The thyroid nodule biopsy was nondiagnostic. We'll have them recheck the thyroid nodule biopsy on the left but it may get the same result. If so, then we would just follow with ultrasound as I do not want to have you have to have surgery. The biopsy needs to be at least 3 months after the next biopsy. Follow up in 1 year with blood work. Likely thyroid ultrasound also but I will order this later.

## 2024-09-02 ENCOUNTER — NURSE TRIAGE (OUTPATIENT)
Dept: OTHER | Facility: OTHER | Age: 79
End: 2024-09-02

## 2024-09-02 NOTE — TELEPHONE ENCOUNTER
"Reason for Disposition   [1] Caller requesting NON-URGENT health information AND [2] PCP's office is the best resource    Answer Assessment - Initial Assessment Questions  1. REASON FOR CALL or QUESTION: \"What is your reason for calling today?\" or \"How can I best help you?\" or \"What question do you have that I can help answer?\"      Patient has US scheduled for tomorrow at [facility]; is concerned that they won't be able to see the order    Protocols used: Information Only Call-ADULT-AH      Patient is scheduled at [facility] for US tomorrow at unknown time. Concerned that [facility] will not be able to view the order.  Please sent US order [facility].  "

## 2024-09-02 NOTE — TELEPHONE ENCOUNTER
"Regarding: ultrasound questions  ----- Message from Celeste ACEVES sent at 9/2/2024  3:19 PM EDT -----  \" I wanted to know If I would still be getting my ultrasound because of my [facility], Would I still be able to get it\"    "

## 2024-09-03 ENCOUNTER — TELEPHONE (OUTPATIENT)
Age: 79
End: 2024-09-03

## 2024-09-03 NOTE — TELEPHONE ENCOUNTER
Patient calling back asking if we can fax US thyroid order to [place].    Fax number [phone]    Pt would also like this mailed to her home.

## 2024-09-10 ENCOUNTER — TELEPHONE (OUTPATIENT)
Age: 79
End: 2024-09-10

## 2024-09-10 NOTE — TELEPHONE ENCOUNTER
Patient called in as she received the order for the US order today in the mail and she is very upset. Patient was under the understanding that she would first have just an US and see what that shows. Then based on the results would only do a needle biopsy if absolutely necessary because of issues last time. Patient said she dicussed this previously with the doctor.     Patient would like a call back ASAP so she can schedule her appt.

## 2024-09-11 DIAGNOSIS — E04.2 MULTIPLE THYROID NODULES: Primary | ICD-10-CM

## 2024-09-11 NOTE — TELEPHONE ENCOUNTER
Yes, she is only supposed to get an ultrasound done, not the needle biopsy.  I think what happens is the order for last years needle biopsy that she did not have done was reprinted and mailed to her instead of a standard ultrasound order.  I did reorder just the thyroid ultrasound.  We can fax it to wherever she needs it faxed to.

## 2024-09-12 NOTE — TELEPHONE ENCOUNTER
Patient is calling in regards to needed her US ordered mailed to home address. Please update her when we get it shipped out.

## 2024-09-13 NOTE — TELEPHONE ENCOUNTER
Patient calling to check if her US was mailed. Informed her it was mailed today. She asked if the US thyroid can be faxed to [facility].   US thyroid faxed to [phone]  No further action needed

## 2024-09-17 NOTE — TELEPHONE ENCOUNTER
Patient calling again stating she has not received the US thyroid in the mail. Informed her it was sent out on 9/13 and to give it some time to get to her house. Also informed her the order was faxed to [place] at the number she provided.  No further action needed

## 2024-11-11 ENCOUNTER — TELEPHONE (OUTPATIENT)
Age: 79
End: 2024-11-11

## 2024-11-11 NOTE — TELEPHONE ENCOUNTER
Pt has been sick do u want her to come in.  she has a lot of mucus left yet, a cough. Taking mucinex. Since last week.   Please call the pt

## 2024-11-14 ENCOUNTER — OFFICE VISIT (OUTPATIENT)
Dept: ENDOCRINOLOGY | Facility: HOSPITAL | Age: 79
End: 2024-11-14
Payer: MEDICARE

## 2024-11-14 VITALS
HEIGHT: 61 IN | WEIGHT: 143.6 LBS | DIASTOLIC BLOOD PRESSURE: 94 MMHG | SYSTOLIC BLOOD PRESSURE: 146 MMHG | HEART RATE: 76 BPM | BODY MASS INDEX: 27.11 KG/M2

## 2024-11-14 DIAGNOSIS — E78.5 HYPERLIPIDEMIA, UNSPECIFIED HYPERLIPIDEMIA TYPE: ICD-10-CM

## 2024-11-14 DIAGNOSIS — E04.2 MULTIPLE THYROID NODULES: Primary | ICD-10-CM

## 2024-11-14 DIAGNOSIS — R73.03 PREDIABETES: ICD-10-CM

## 2024-11-14 PROCEDURE — 99214 OFFICE O/P EST MOD 30 MIN: CPT | Performed by: NURSE PRACTITIONER

## 2024-11-14 NOTE — PROGRESS NOTES
Blossom Ruiz 79 y.o. female MRN: [id number]    Encounter: [id number]      Assessment & Plan     Assessment:  This is a 79 y.o.-year-old female with thyroid nodules, hyperlipidemia and prediabetes.    Plan:  1.  Multiple thyroid nodules.  Most recent thyroid function studies are normal.  She is biochemically and clinically euthyroid.  She had a thyroid ultrasound performed on her left lower lobe thyroid nodule had increased in size and was T RADS 5.  She underwent biopsy but unfortunately the biopsy was acellular and nondiagnostic.  Discussed options options include rebiopsy or observing over time.  There are some discussion, she would like to rebiopsy.  Order was placed to rebiopsy that thyroid nodule under ultrasound guidance and send an extra sample for molecular testing/Afirma testing if it does read [place] class III or IV.  She was warned that if it again says that it is acellular, then we would have to just follow it over time with serial ultrasounds or surgically remove it.     2.  Prediabetes.  Hemoglobin A1c is 6.2%.  This is slightly improved.  I reassured her she does not have diabetes.  Diabetic lower carbohydrate diet means limiting carbohydrates to 45 to 60 g per meal and 15 to 30 g per snack and she was given examples of carbohydrate servings.     3.  Hyperlipidemia.  Her recent lipid profile does show an elevated total cholesterol at 216 but her HDL cholesterol is 95 and her LDL is 93.  I reassured her that given how high her HDL is, that is falsely elevating her total cholesterol and she does not need any cholesterol-lowering agents based on this profile.    CC: Thyroid nodule/prediabetes follow-up    History of Present Illness     HPI:  79 y.o. year old female with history of multiple thyroid nodules with hyperlipidemia and prediabetes for follow-up visit.     Thyroid nodules were incidentally discovered years ago.  She had 2 rounds of biopsies which demonstrated benign nodules.  She had been  monitored over time.  In 2005, a right upper lobe nodules were biopsied and in July 2012 left thyroid nodule that was newly calcified was biopsied and both were negative for malignancy.  She has no personal history of head or neck irradiation and no relatives with thyroid disease.  She denies heat or cold intolerance, palpitation except with stress, or tremors.  Weight is 4 pounds less than last year.  She reveals that she is always fatigued and has no energy.  She does have a lot of insomnia and will wake frequently from sleep.  She denies diarrhea or constipation, anxiety or depression.  She has dry skin and always has brittle nails but no hair loss.  She denies diplopia.  She does have some trouble with swallowing certain foods like rice or meats if they are not cut up into small pieces and she must drink water a lot to swallow these foods.     She has hyperlipidemia and takes pravastatin 20 mg daily.  She denies chest pain but does have some shortness of breath when more walking far distances.     She has a history of prediabetes but is currently on no medications.admits eating more sweets and bread.  She has no polyuria, polydipsia, or polyphasia but has 3-4 times per night nocturia.  She has no numbness or tingling of the feet.  She has no blurry vision.    Review of Systems   Constitutional: Negative.  Negative for chills, fatigue and fever.   HENT: Negative.  Negative for trouble swallowing and voice change.    Eyes: Negative.  Negative for photophobia, pain, discharge, redness, itching and visual disturbance.   Respiratory: Negative.  Negative for chest tightness and shortness of breath.    Cardiovascular: Negative.  Negative for chest pain.   Gastrointestinal: Negative.  Negative for abdominal pain, constipation, diarrhea and vomiting.   Endocrine: Negative for cold intolerance, heat intolerance, polydipsia, polyphagia and polyuria.   Genitourinary: Negative.    Musculoskeletal: Negative.    Skin:  Negative.    Allergic/Immunologic: Negative.    Neurological: Negative.  Negative for dizziness, syncope, light-headedness and headaches.   Hematological: Negative.    Psychiatric/Behavioral: Negative.     All other systems reviewed and are negative.      Historical Information   Past Medical History:   Diagnosis Date    Angioedema     osteoarthritis     back    Osteopenia      History reviewed. No pertinent surgical history.  Social History   Social History     Substance and Sexual Activity   Alcohol Use No     Social History     Substance and Sexual Activity   Drug Use No     Social History     Tobacco Use   Smoking Status Never   Smokeless Tobacco Never     Family History:   Family History   Problem Relation Age of Onset    Hypertension Sister     Asthma Sister     No Known Problems Sister     Heart attack Other     Heart failure Other     Seizures Other     Asthma Other        Meds/Allergies   Current Outpatient Medications   Medication Sig Dispense Refill    albuterol (ACCUNEB) 1.25 MG/3ML nebulizer solution Take 1 ampule by nebulization every 6 (six) hours as needed for wheezing      B Complex Vitamins (B COMPLEX 100 PO) Take by mouth With vitamin C in it      Cholecalciferol (VITAMIN D3) 1000 units CAPS Take 2,000 Units by mouth      diphenhydrAMINE (BENADRYL) 25 mg capsule Take 25 mg by mouth every 6 (six) hours as needed for itching      fexofenadine (ALLEGRA) 180 MG tablet Take 180 mg by mouth daily      pravastatin (PRAVACHOL) 20 mg tablet Take by mouth daily        vitamin E, tocopherol, 400 units capsule Take 400 Units by mouth daily      fluticasone (FLONASE) 50 mcg/act nasal spray 1 spray into each nostril daily (Patient not taking: Reported on 10/18/2023)      PAPAYA ENZYME PO Take by mouth (Patient not taking: Reported on 10/18/2023)       No current facility-administered medications for this visit.     Allergies   Allergen Reactions    Aspirin     Blue Dyes (Parenteral) - Food Allergy     Latex      "Metoprolol     Nifedipine        Objective   Vitals: Blood pressure 146/94, pulse 76, height 5' 0.71\" (1.542 m), weight 65.1 kg (143 lb 9.6 oz).    Physical Exam  Vitals reviewed.   Constitutional:       Appearance: She is well-developed.   HENT:      Head: Normocephalic and atraumatic.   Eyes:      Conjunctiva/sclera: Conjunctivae normal.      Pupils: Pupils are equal, round, and reactive to light.   Cardiovascular:      Rate and Rhythm: Normal rate and regular rhythm.      Heart sounds: Normal heart sounds.   Pulmonary:      Effort: Pulmonary effort is normal.      Breath sounds: Normal breath sounds.   Abdominal:      General: Bowel sounds are normal.      Palpations: Abdomen is soft.   Musculoskeletal:         General: Normal range of motion.      Cervical back: Normal range of motion and neck supple.   Skin:     General: Skin is warm and dry.   Neurological:      Mental Status: She is alert and oriented to person, place, and time.   Psychiatric:         Behavior: Behavior normal.         Thought Content: Thought content normal.         Judgment: Judgment normal.           Portions of the record may have been created with voice recognition software. Occasional wrong word or \"sound a like\" substitutions may have occurred due to the inherent limitations of voice recognition software. Read the chart carefully and recognize, using context, where substitutions have occurred.    "

## 2024-11-14 NOTE — PATIENT INSTRUCTIONS
Be mindful of diet.    Stay active and stay hydrated.    Hemoglobin A1c is 6.2 which is in the prediabetes range.    Consider the re-biopsy of your thyroid nodule.    Follow up with Dr. Hatch regarding results of your recent DEXA Scan.    Continue Vitamin D supplementation.

## 2024-11-18 ENCOUNTER — TELEPHONE (OUTPATIENT)
Age: 79
End: 2024-11-18

## 2024-11-18 NOTE — TELEPHONE ENCOUNTER
pt wants her order for US guided thyroid biopsy to [facility] for pt to make appt there. Fax--[phone]-fax.

## 2024-11-18 NOTE — TELEPHONE ENCOUNTER
I called and made the patient aware that I faxed over the order for the biopsy to [facility] for her to schedule her appointment

## 2024-11-19 ENCOUNTER — TELEPHONE (OUTPATIENT)
Age: 79
End: 2024-11-19

## 2024-11-19 NOTE — TELEPHONE ENCOUNTER
Patient called to advise us she scheduled with [facility] Scheduling to make her appointment for her needle biopsy.    January 13 th at 9:45 am     Concerned about her results.     Also, wanted to say she really liked Inocencio Núñez and appreciate his time and patience.    If he needs to contact her for anything - he may reach out to her if necessary.

## 2024-11-26 ENCOUNTER — TELEPHONE (OUTPATIENT)
Age: 79
End: 2024-11-26

## 2024-11-26 NOTE — TELEPHONE ENCOUNTER
Patient calling to let Dr. Gandhi know she will be canceling her biopsy in January.  She spoke with her PCP who said she does not need it due to the size of the nodule.  LORENZA

## 2024-11-26 NOTE — TELEPHONE ENCOUNTER
I was able to call the patient and make her aware of the provider's recommendations and she stated that she is going to call and keep the appointment

## 2024-11-26 NOTE — TELEPHONE ENCOUNTER
I do not agree. Based on the size of the nodule of 2.3 cm and the calcifications in the nodule, it is high risk of cancer and the last biopsy was nondiagnostic so didn't tell us anything and this nodule should be rebiopsied.

## 2024-11-27 NOTE — TELEPHONE ENCOUNTER
Patient  requests order for thyroid biopsy and thyroid US results faxed to [facility] [phone]. Thank you.

## 2024-12-02 ENCOUNTER — TELEPHONE (OUTPATIENT)
Age: 79
End: 2024-12-02

## 2024-12-02 NOTE — TELEPHONE ENCOUNTER
Received a call from Samira at [facility] [employer] [facility] phone [phone] asking for the CPT codes for the U/S guided thyroid biopsy with molecular testing. Warm transfer to the office and spoke with Sabrina who gave [id number] and [id number] to pass along which I did.       
Him/He

## 2024-12-02 NOTE — TELEPHONE ENCOUNTER
Received a call from Samira at [facility] [employer] [facility] phone [phone] asking for the CPT codes for the U/S guided thyroid biopsy with molecular testing. Warm transfer to the office and spoke with Sabrina who gave [id number] and [id number] to pass along which I did.

## 2024-12-17 ENCOUNTER — RESULTS FOLLOW-UP (OUTPATIENT)
Dept: ENDOCRINOLOGY | Facility: HOSPITAL | Age: 79
End: 2024-12-17

## 2024-12-20 ENCOUNTER — RESULTS FOLLOW-UP (OUTPATIENT)
Dept: ENDOCRINOLOGY | Facility: HOSPITAL | Age: 79
End: 2024-12-20

## 2024-12-20 NOTE — TELEPHONE ENCOUNTER
Patient called and stated she received her biopsy results and is glad they are benign. She wants to know if we can fax these results to her PCP.

## 2024-12-20 NOTE — RESULT ENCOUNTER NOTE
Please call the patient regarding result.  Pathology from her thyroid nodule biopsy is [place] category 2-benign.

## 2025-05-16 ENCOUNTER — TELEPHONE (OUTPATIENT)
Age: 80
End: 2025-05-16

## 2025-05-16 NOTE — TELEPHONE ENCOUNTER
Patient called stating on 4/15 she had a CT scan done at [facility] that is uploaded into the media and Dr. Hatch was concerned with her thyroid mass. She is asking if Dr. Gandhi can review and just make sure everything is ok.     She said she is always tired and not sure if this can be related?

## 2025-07-22 ENCOUNTER — TELEPHONE (OUTPATIENT)
Age: 80
End: 2025-07-22

## 2025-07-22 DIAGNOSIS — E04.2 MULTIPLE THYROID NODULES: Primary | ICD-10-CM

## 2025-07-22 NOTE — TELEPHONE ENCOUNTER
A detailed message was left for the patient that the new order has been placed into her chart and she is to call if there are any additional questions.

## 2025-07-22 NOTE — TELEPHONE ENCOUNTER
Phone call from patient inquiring about Ultrasound Order prior to scheduled appointment with Minesh Núñez. Please update Ultrasound Order since it's . Although, it has an expiration date of 28 - central scheduling will not schedule patients if order is a past a year. Current expected day for Order in chart is 24, therefore not valid.     Please contact patient, and inform when order is ready, and she can schedule appointment.